# Patient Record
Sex: FEMALE | Race: WHITE
[De-identification: names, ages, dates, MRNs, and addresses within clinical notes are randomized per-mention and may not be internally consistent; named-entity substitution may affect disease eponyms.]

---

## 2022-12-05 ENCOUNTER — P2P PATIENT RECORD (OUTPATIENT)
Age: 67
End: 2022-12-05

## 2022-12-08 ENCOUNTER — P2P PATIENT RECORD (OUTPATIENT)
Age: 67
End: 2022-12-08

## 2023-01-12 ENCOUNTER — WEB ENCOUNTER (OUTPATIENT)
Dept: URBAN - METROPOLITAN AREA TELEHEALTH 2 | Facility: TELEHEALTH | Age: 68
End: 2023-01-12

## 2023-01-18 ENCOUNTER — LAB OUTSIDE AN ENCOUNTER (OUTPATIENT)
Dept: URBAN - METROPOLITAN AREA TELEHEALTH 2 | Facility: TELEHEALTH | Age: 68
End: 2023-01-18

## 2023-01-18 ENCOUNTER — TELEPHONE ENCOUNTER (OUTPATIENT)
Dept: URBAN - NONMETROPOLITAN AREA CLINIC 2 | Facility: CLINIC | Age: 68
End: 2023-01-18

## 2023-01-18 ENCOUNTER — OFFICE VISIT (OUTPATIENT)
Dept: URBAN - METROPOLITAN AREA TELEHEALTH 2 | Facility: TELEHEALTH | Age: 68
End: 2023-01-18
Payer: MEDICARE

## 2023-01-18 DIAGNOSIS — R11.2 NAUSEA AND VOMITING, UNSPECIFIED VOMITING TYPE: ICD-10-CM

## 2023-01-18 DIAGNOSIS — K57.30 ACQUIRED DIVERTICULOSIS OF COLON: ICD-10-CM

## 2023-01-18 DIAGNOSIS — K59.09 OTHER CONSTIPATION: ICD-10-CM

## 2023-01-18 DIAGNOSIS — Z12.11 COLON CANCER SCREENING: ICD-10-CM

## 2023-01-18 PROCEDURE — 99204 OFFICE O/P NEW MOD 45 MIN: CPT | Performed by: INTERNAL MEDICINE

## 2023-01-18 RX ORDER — OCTISALATE, AVOBENZONE, HOMOSALATE, AND OCTOCRYLENE 29.4; 29.4; 49; 25.48 MG/ML; MG/ML; MG/ML; MG/ML
AS DIRECTED LOTION TOPICAL
Status: ACTIVE | COMMUNITY

## 2023-01-18 RX ORDER — ESCITALOPRAM OXALATE 10 MG/1
1 TABLET TABLET, FILM COATED ORAL ONCE A DAY
Status: ACTIVE | COMMUNITY

## 2023-01-18 NOTE — HPI-TODAY'S VISIT:
1/18/2023 Rowan is a 66 YO who presents for evaluation of nausea, vomiting and diverticulitis. In September she developed UTI symptoms. She was treated with antibiotics with no relief. She went to [facility] and was diagnosed with transverse diverticulitis. She was treated with levoquin and flagyl. She subsequently went to the ED again in November 2022 with repeat imaging with resolution of her diverticulitis. She continued to have persistent N/V on a daily basis. Finally in December she was able to start keeping down liquids on a regular basis. She started vomiting again new year day with severe gas trapping. Her last colonoscopy was in 2020 by Dr. Jarvis. She has a history of pandiverticular disease and a sigmoid resection in 2008. She has had an EGD in the past but this was years ago. She is s/p CCY. She feels the glipizide seemed to trigger the UTI and cascaded this three months of severe symptoms of N/V and diverticulitis. She states the N/V have improved since 1/10/2023. She is now gas trapping with constipation again. She is not taking anything for her GI tract.   This telehealth visit was provided at the patient's home.

## 2023-02-14 ENCOUNTER — TELEPHONE ENCOUNTER (OUTPATIENT)
Dept: URBAN - METROPOLITAN AREA CLINIC 92 | Facility: CLINIC | Age: 68
End: 2023-02-14

## 2023-02-21 ENCOUNTER — WEB ENCOUNTER (OUTPATIENT)
Dept: URBAN - NONMETROPOLITAN AREA SURGERY CENTER 1 | Facility: SURGERY CENTER | Age: 68
End: 2023-02-21

## 2023-02-27 ENCOUNTER — CLAIMS CREATED FROM THE CLAIM WINDOW (OUTPATIENT)
Dept: URBAN - METROPOLITAN AREA CLINIC 4 | Facility: CLINIC | Age: 68
End: 2023-02-27
Payer: MEDICARE

## 2023-02-27 ENCOUNTER — OFFICE VISIT (OUTPATIENT)
Dept: URBAN - NONMETROPOLITAN AREA SURGERY CENTER 1 | Facility: SURGERY CENTER | Age: 68
End: 2023-02-27
Payer: MEDICARE

## 2023-02-27 ENCOUNTER — TELEPHONE ENCOUNTER (OUTPATIENT)
Dept: URBAN - METROPOLITAN AREA CLINIC 92 | Facility: CLINIC | Age: 68
End: 2023-02-27

## 2023-02-27 DIAGNOSIS — K21.9 ACID REFLUX: ICD-10-CM

## 2023-02-27 DIAGNOSIS — K31.89 OTHER DISEASES OF STOMACH AND DUODENUM: ICD-10-CM

## 2023-02-27 PROCEDURE — 88305 TISSUE EXAM BY PATHOLOGIST: CPT | Performed by: PATHOLOGY

## 2023-02-27 PROCEDURE — G8907 PT DOC NO EVENTS ON DISCHARG: HCPCS | Performed by: INTERNAL MEDICINE

## 2023-02-27 PROCEDURE — 88312 SPECIAL STAINS GROUP 1: CPT | Performed by: PATHOLOGY

## 2023-02-27 PROCEDURE — 43239 EGD BIOPSY SINGLE/MULTIPLE: CPT | Performed by: INTERNAL MEDICINE

## 2023-02-27 RX ORDER — ESCITALOPRAM OXALATE 10 MG/1
1 TABLET TABLET, FILM COATED ORAL ONCE A DAY
Status: ACTIVE | COMMUNITY

## 2023-02-27 RX ORDER — OMEPRAZOLE 40 MG/1
1 CAPSULE 30 MINUTES BEFORE MORNING MEAL CAPSULE, DELAYED RELEASE ORAL ONCE A DAY
Qty: 90 CAPSULES | Refills: 3 | OUTPATIENT
Start: 2023-02-27

## 2023-02-27 RX ORDER — OCTISALATE, AVOBENZONE, HOMOSALATE, AND OCTOCRYLENE 29.4; 29.4; 49; 25.48 MG/ML; MG/ML; MG/ML; MG/ML
AS DIRECTED LOTION TOPICAL
Status: ACTIVE | COMMUNITY

## 2023-03-02 ENCOUNTER — OFFICE VISIT (OUTPATIENT)
Dept: URBAN - METROPOLITAN AREA TELEHEALTH 2 | Facility: TELEHEALTH | Age: 68
End: 2023-03-02
Payer: MEDICARE

## 2023-03-02 VITALS — WEIGHT: 163 LBS | BODY MASS INDEX: 30.78 KG/M2 | HEIGHT: 61 IN

## 2023-03-02 DIAGNOSIS — E11.9 DIABETES: ICD-10-CM

## 2023-03-02 DIAGNOSIS — K31.84 GASTROPARESIS: ICD-10-CM

## 2023-03-02 PROCEDURE — 97802 MEDICAL NUTRITION INDIV IN: CPT | Performed by: DIETITIAN, REGISTERED

## 2023-03-02 RX ORDER — ESCITALOPRAM OXALATE 10 MG/1
1 TABLET TABLET, FILM COATED ORAL ONCE A DAY
Status: ACTIVE | COMMUNITY

## 2023-03-02 RX ORDER — OMEPRAZOLE 40 MG/1
1 CAPSULE 30 MINUTES BEFORE MORNING MEAL CAPSULE, DELAYED RELEASE ORAL ONCE A DAY
Qty: 90 CAPSULES | Refills: 3 | Status: ACTIVE | COMMUNITY
Start: 2023-02-27

## 2023-03-02 RX ORDER — OCTISALATE, AVOBENZONE, HOMOSALATE, AND OCTOCRYLENE 29.4; 29.4; 49; 25.48 MG/ML; MG/ML; MG/ML; MG/ML
AS DIRECTED LOTION TOPICAL
Status: ACTIVE | COMMUNITY

## 2023-06-07 ENCOUNTER — OFFICE VISIT (OUTPATIENT)
Dept: URBAN - METROPOLITAN AREA TELEHEALTH 2 | Facility: TELEHEALTH | Age: 68
End: 2023-06-07
Payer: MEDICARE

## 2023-06-07 DIAGNOSIS — K31.84 GASTROPARESIS: ICD-10-CM

## 2023-06-07 DIAGNOSIS — K21.00 GASTROESOPHAGEAL REFLUX DISEASE WITH ESOPHAGITIS WITHOUT HEMORRHAGE: ICD-10-CM

## 2023-06-07 DIAGNOSIS — K59.04 CHRONIC IDIOPATHIC CONSTIPATION: ICD-10-CM

## 2023-06-07 DIAGNOSIS — K57.90 DIVERTICULOSIS: ICD-10-CM

## 2023-06-07 PROBLEM — 266433003: Status: ACTIVE | Noted: 2023-02-27

## 2023-06-07 PROCEDURE — 99214 OFFICE O/P EST MOD 30 MIN: CPT | Performed by: NURSE PRACTITIONER

## 2023-06-07 RX ORDER — OCTISALATE, AVOBENZONE, HOMOSALATE, AND OCTOCRYLENE 29.4; 29.4; 49; 25.48 MG/ML; MG/ML; MG/ML; MG/ML
AS DIRECTED LOTION TOPICAL
Status: ACTIVE | COMMUNITY

## 2023-06-07 RX ORDER — ESCITALOPRAM OXALATE 10 MG/1
1 TABLET TABLET, FILM COATED ORAL ONCE A DAY
Status: ACTIVE | COMMUNITY

## 2023-06-07 RX ORDER — FAMOTIDINE 40 MG/1
1 TABLET AT BEDTIME TABLET, FILM COATED ORAL
Qty: 90 TABLET | Refills: 3 | OUTPATIENT
Start: 2023-06-07

## 2023-06-07 RX ORDER — OMEPRAZOLE 40 MG/1
1 CAPSULE 30 MINUTES BEFORE MORNING MEAL CAPSULE, DELAYED RELEASE ORAL ONCE A DAY
Qty: 90 CAPSULES | Refills: 3 | Status: ACTIVE | COMMUNITY
Start: 2023-02-27

## 2023-06-07 NOTE — HPI-TODAY'S VISIT:
1/18/2023 Rowan is a 68 YO who presents for evaluation of nausea, vomiting and diverticulitis. In September she developed UTI symptoms. She was treated with antibiotics with no relief. She went to [facility] and was diagnosed with transverse diverticulitis. She was treated with levoquin and flagyl. She subsequently went to the ED again in November 2022 with repeat imaging with resolution of her diverticulitis. She continued to have persistent N/V on a daily basis. Finally in December she was able to start keeping down liquids on a regular basis. She started vomiting again new year day with severe gas trapping. Her last colonoscopy was in 2020 by Dr. Jarvis. She has a history of pandiverticular disease and a sigmoid resection in 2008. She has had an EGD in the past but this was years ago. She is s/p CCY. She feels the glipizide seemed to trigger the UTI and cascaded this three months of severe symptoms of N/V and diverticulitis. She states the N/V have improved since 1/10/2023. She is now gas trapping with constipation again. She is not taking anything for her GI tract.   This telehealth visit was provided at the patient's home.  6/7/2023 Rowan presents for follow-up of gastroparesis.  Since her last visit her gastric emptying study is positive and her EGD reveals reflux and gastritis.  Her reflux has improved, she could not tolerate pantoprazole but is on omeprazole 40 mg in the morning.  Her nausea and vomiting have improved somewhat but she still has occasional episodes when she overeats.  She agrees her diet is poor.  She did meet with our nutritionist.  Her bowels have been more loose on fiber and MiraLAX, we have discussed cutting back on the fiber due to the gas at nighttime and cutting her MiraLAX to half capful daily.  She had no further episodes of diverticulitis.  Today she continues to struggle with reflux and bloating.  She continues to have abdominal obesity.  Today we have discussed pursuing strict nutritional regimen associated with gastroparesis.  She declines a trial of low-dose Reglan.  She has not lost any significant weight since her last visit.  She is currently off all of her diabetic medications.  Today she continues to struggle with her GI complaints.  MB  This telehealth visit was provided at the patient's home.

## 2023-11-08 ENCOUNTER — OFFICE VISIT (OUTPATIENT)
Dept: URBAN - NONMETROPOLITAN AREA CLINIC 13 | Facility: CLINIC | Age: 68
End: 2023-11-08
Payer: MEDICARE

## 2023-11-08 VITALS
DIASTOLIC BLOOD PRESSURE: 85 MMHG | BODY MASS INDEX: 30.66 KG/M2 | TEMPERATURE: 98.5 F | SYSTOLIC BLOOD PRESSURE: 130 MMHG | HEIGHT: 61 IN | HEART RATE: 108 BPM | WEIGHT: 162.4 LBS

## 2023-11-08 DIAGNOSIS — Z12.11 COLON CANCER SCREENING: ICD-10-CM

## 2023-11-08 DIAGNOSIS — K31.84 GASTROPARESIS: ICD-10-CM

## 2023-11-08 DIAGNOSIS — K57.90 DIVERTICULOSIS: ICD-10-CM

## 2023-11-08 DIAGNOSIS — K59.04 CHRONIC IDIOPATHIC CONSTIPATION: ICD-10-CM

## 2023-11-08 DIAGNOSIS — K21.00 GASTROESOPHAGEAL REFLUX DISEASE WITH ESOPHAGITIS WITHOUT HEMORRHAGE: ICD-10-CM

## 2023-11-08 PROBLEM — 397881000: Status: ACTIVE | Noted: 2023-01-18

## 2023-11-08 PROBLEM — 235675006: Status: ACTIVE | Noted: 2023-02-14

## 2023-11-08 PROBLEM — 82934008: Status: ACTIVE | Noted: 2023-06-07

## 2023-11-08 PROBLEM — 305058001: Status: ACTIVE | Noted: 2023-01-18

## 2023-11-08 PROCEDURE — 99214 OFFICE O/P EST MOD 30 MIN: CPT | Performed by: NURSE PRACTITIONER

## 2023-11-08 RX ORDER — METOCLOPRAMIDE HYDROCHLORIDE 15 MG/.07ML
1 SPRAY BEFORE MEALS AS NEEDED SPRAY NASAL
Qty: 1 UNSPECIFIED | Refills: 3 | OUTPATIENT
Start: 2023-11-08

## 2023-11-08 RX ORDER — FAMOTIDINE 40 MG/1
1 TABLET AT BEDTIME TABLET, FILM COATED ORAL
Qty: 90 TABLET | Refills: 3 | Status: ACTIVE | COMMUNITY
Start: 2023-06-07

## 2023-11-08 RX ORDER — OCTISALATE, AVOBENZONE, HOMOSALATE, AND OCTOCRYLENE 29.4; 29.4; 49; 25.48 MG/ML; MG/ML; MG/ML; MG/ML
AS DIRECTED LOTION TOPICAL
Status: ACTIVE | COMMUNITY

## 2023-11-08 RX ORDER — ESCITALOPRAM OXALATE 10 MG/1
1 TABLET TABLET, FILM COATED ORAL ONCE A DAY
Status: ON HOLD | COMMUNITY

## 2023-11-08 RX ORDER — OMEPRAZOLE 40 MG/1
1 CAPSULE 30 MINUTES BEFORE MORNING MEAL CAPSULE, DELAYED RELEASE ORAL ONCE A DAY
Qty: 90 CAPSULES | Refills: 3 | Status: ACTIVE | COMMUNITY
Start: 2023-02-27

## 2023-11-08 NOTE — HPI-TODAY'S VISIT:
1/18/2023 Rowan is a 68 YO who presents for evaluation of nausea, vomiting and diverticulitis. In September she developed UTI symptoms. She was treated with antibiotics with no relief. She went to [facility] and was diagnosed with transverse diverticulitis. She was treated with levoquin and flagyl. She subsequently went to the ED again in November 2022 with repeat imaging with resolution of her diverticulitis. She continued to have persistent N/V on a daily basis. Finally in December she was able to start keeping down liquids on a regular basis. She started vomiting again new year day with severe gas trapping. Her last colonoscopy was in 2020 by Dr. Jarvis. She has a history of pandiverticular disease and a sigmoid resection in 2008. She has had an EGD in the past but this was years ago. She is s/p CCY. She feels the glipizide seemed to trigger the UTI and cascaded this three months of severe symptoms of N/V and diverticulitis. She states the N/V have improved since 1/10/2023. She is now gas trapping with constipation again. She is not taking anything for her GI tract.   This telehealth visit was provided at the patient's home.  6/7/2023 Rowan presents for follow-up of gastroparesis.  Since her last visit her gastric emptying study is positive and her EGD reveals reflux and gastritis.  Her reflux has improved, she could not tolerate pantoprazole but is on omeprazole 40 mg in the morning.  Her nausea and vomiting have improved somewhat but she still has occasional episodes when she overeats.  She agrees her diet is poor.  She did meet with our nutritionist.  Her bowels have been more loose on fiber and MiraLAX, we have discussed cutting back on the fiber due to the gas at nighttime and cutting her MiraLAX to half capful daily.  She had no further episodes of diverticulitis.  Today she continues to struggle with reflux and bloating.  She continues to have abdominal obesity.  Today we have discussed pursuing strict nutritional regimen associated with gastroparesis.  She declines a trial of low-dose Reglan.  She has not lost any significant weight since her last visit.  She is currently off all of her diabetic medications.  Today she continues to struggle with her GI complaints.  MB  This telehealth visit was provided at the patient's home. 11/8/2023 Rowan presents for follow-up of gastroparesis.  She is on PPI in the morning and famotidine before supper.  She still has occasional episodes of nausea and vomiting.  Today we discussed risk and benefits and she does agree to pursue Reglan, she understands that there is a risk of tardive dyskinesia, we will try gimoti.  Her bowels are moving regularly on MiraLAX half capful daily and probiotic daily.  MB

## 2024-05-08 ENCOUNTER — OFFICE VISIT (OUTPATIENT)
Dept: URBAN - NONMETROPOLITAN AREA CLINIC 13 | Facility: CLINIC | Age: 69
End: 2024-05-08
Payer: MEDICARE

## 2024-05-08 ENCOUNTER — DASHBOARD ENCOUNTERS (OUTPATIENT)
Age: 69
End: 2024-05-08

## 2024-05-08 VITALS
BODY MASS INDEX: 30.47 KG/M2 | SYSTOLIC BLOOD PRESSURE: 134 MMHG | HEIGHT: 61 IN | WEIGHT: 161.4 LBS | DIASTOLIC BLOOD PRESSURE: 84 MMHG | HEART RATE: 84 BPM

## 2024-05-08 DIAGNOSIS — K57.90 DIVERTICULOSIS: ICD-10-CM

## 2024-05-08 DIAGNOSIS — K31.84 GASTROPARESIS: ICD-10-CM

## 2024-05-08 DIAGNOSIS — Z12.11 COLON CANCER SCREENING: ICD-10-CM

## 2024-05-08 DIAGNOSIS — K21.00 GASTROESOPHAGEAL REFLUX DISEASE WITH ESOPHAGITIS WITHOUT HEMORRHAGE: ICD-10-CM

## 2024-05-08 DIAGNOSIS — K59.04 CHRONIC IDIOPATHIC CONSTIPATION: ICD-10-CM

## 2024-05-08 PROCEDURE — 99214 OFFICE O/P EST MOD 30 MIN: CPT | Performed by: NURSE PRACTITIONER

## 2024-05-08 RX ORDER — FAMOTIDINE 40 MG/1
1 TABLET AT BEDTIME TABLET, FILM COATED ORAL
Qty: 90 TABLET | Refills: 3 | Status: ACTIVE | COMMUNITY
Start: 2023-06-07

## 2024-05-08 RX ORDER — INSULIN DEGLUDEC INJECTION 100 U/ML
AS DIRECTED INJECTION, SOLUTION SUBCUTANEOUS
Status: ACTIVE | COMMUNITY

## 2024-05-08 RX ORDER — ESCITALOPRAM OXALATE 10 MG/1
1 TABLET TABLET, FILM COATED ORAL ONCE A DAY
Status: ON HOLD | COMMUNITY

## 2024-05-08 RX ORDER — OMEPRAZOLE 20 MG/1
1 CAPSULE 30 MINUTES BEFORE MORNING MEAL CAPSULE, DELAYED RELEASE ORAL ONCE A DAY
Qty: 90 CAPSULES | Refills: 3
Start: 2023-02-27

## 2024-05-08 RX ORDER — OCTISALATE, AVOBENZONE, HOMOSALATE, AND OCTOCRYLENE 29.4; 29.4; 49; 25.48 MG/ML; MG/ML; MG/ML; MG/ML
AS DIRECTED LOTION TOPICAL
Status: ACTIVE | COMMUNITY

## 2024-05-08 RX ORDER — OMEPRAZOLE 40 MG/1
1 CAPSULE 30 MINUTES BEFORE MORNING MEAL CAPSULE, DELAYED RELEASE ORAL ONCE A DAY
Qty: 90 CAPSULES | Refills: 3 | Status: ACTIVE | COMMUNITY
Start: 2023-02-27

## 2024-06-05 ENCOUNTER — ERX REFILL RESPONSE (OUTPATIENT)
Dept: URBAN - NONMETROPOLITAN AREA CLINIC 2 | Facility: CLINIC | Age: 69
End: 2024-06-05

## 2024-06-05 RX ORDER — FAMOTIDINE 40 MG/1
1 TABLET AT BEDTIME TABLET, FILM COATED ORAL
Qty: 90 TABLET | Refills: 3 | OUTPATIENT

## 2024-11-01 ENCOUNTER — WEB ENCOUNTER (OUTPATIENT)
Dept: URBAN - NONMETROPOLITAN AREA CLINIC 2 | Facility: CLINIC | Age: 69
End: 2024-11-01

## 2024-11-07 ENCOUNTER — OFFICE VISIT (OUTPATIENT)
Dept: URBAN - NONMETROPOLITAN AREA CLINIC 2 | Facility: CLINIC | Age: 69
End: 2024-11-07
Payer: MEDICARE

## 2024-11-07 ENCOUNTER — LAB OUTSIDE AN ENCOUNTER (OUTPATIENT)
Dept: URBAN - NONMETROPOLITAN AREA CLINIC 2 | Facility: CLINIC | Age: 69
End: 2024-11-07

## 2024-11-07 DIAGNOSIS — K21.00 GASTROESOPHAGEAL REFLUX DISEASE WITH ESOPHAGITIS WITHOUT HEMORRHAGE: ICD-10-CM

## 2024-11-07 DIAGNOSIS — K31.84 GASTROPARESIS: ICD-10-CM

## 2024-11-07 DIAGNOSIS — K57.90 DIVERTICULOSIS: ICD-10-CM

## 2024-11-07 DIAGNOSIS — R10.32 LLQ ABDOMINAL PAIN: ICD-10-CM

## 2024-11-07 DIAGNOSIS — K59.04 CHRONIC IDIOPATHIC CONSTIPATION: ICD-10-CM

## 2024-11-07 DIAGNOSIS — Z12.11 COLON CANCER SCREENING: ICD-10-CM

## 2024-11-07 PROBLEM — 301716002: Status: ACTIVE | Noted: 2024-11-07

## 2024-11-07 PROCEDURE — 99214 OFFICE O/P EST MOD 30 MIN: CPT | Performed by: NURSE PRACTITIONER

## 2024-11-07 RX ORDER — OMEPRAZOLE 20 MG/1
1 CAPSULE 30 MINUTES BEFORE MORNING MEAL CAPSULE, DELAYED RELEASE ORAL ONCE A DAY
Qty: 90 CAPSULES | Refills: 3 | Status: ACTIVE | COMMUNITY
Start: 2023-02-27

## 2024-11-07 RX ORDER — RIFAXIMIN 550 MG/1
1 TABLET TABLET ORAL THREE TIMES A DAY
Qty: 42 TABLET | Refills: 0 | OUTPATIENT
Start: 2024-11-07 | End: 2024-11-21

## 2024-11-07 RX ORDER — ESCITALOPRAM OXALATE 10 MG/1
1 TABLET TABLET, FILM COATED ORAL ONCE A DAY
Status: ON HOLD | COMMUNITY

## 2024-11-07 RX ORDER — FAMOTIDINE 40 MG/1
1 TABLET AT BEDTIME TABLET, FILM COATED ORAL
Qty: 90 TABLET | Refills: 3 | Status: ACTIVE | COMMUNITY

## 2024-11-07 RX ORDER — INSULIN DEGLUDEC INJECTION 100 U/ML
AS DIRECTED INJECTION, SOLUTION SUBCUTANEOUS
Status: ACTIVE | COMMUNITY

## 2024-11-07 RX ORDER — OCTISALATE, AVOBENZONE, HOMOSALATE, AND OCTOCRYLENE 29.4; 29.4; 49; 25.48 MG/ML; MG/ML; MG/ML; MG/ML
AS DIRECTED LOTION TOPICAL
Status: ACTIVE | COMMUNITY

## 2024-11-07 NOTE — HPI-TODAY'S VISIT:
1/18/2023 Rowan is a 68 YO who presents for evaluation of nausea, vomiting and diverticulitis. In September she developed UTI symptoms. She was treated with antibiotics with no relief. She went to [facility] and was diagnosed with transverse diverticulitis. She was treated with levoquin and flagyl. She subsequently went to the ED again in November 2022 with repeat imaging with resolution of her diverticulitis. She continued to have persistent N/V on a daily basis. Finally in December she was able to start keeping down liquids on a regular basis. She started vomiting again new year day with severe gas trapping. Her last colonoscopy was in 2020 by Dr. Jarvis. She has a history of pandiverticular disease and a sigmoid resection in 2008. She has had an EGD in the past but this was years ago. She is s/p CCY. She feels the glipizide seemed to trigger the UTI and cascaded this three months of severe symptoms of N/V and diverticulitis. She states the N/V have improved since 1/10/2023. She is now gas trapping with constipation again. She is not taking anything for her GI tract.   This telehealth visit was provided at the patient's home.  6/7/2023 Rowan presents for follow-up of gastroparesis.  Since her last visit her gastric emptying study is positive and her EGD reveals reflux and gastritis.  Her reflux has improved, she could not tolerate pantoprazole but is on omeprazole 40 mg in the morning.  Her nausea and vomiting have improved somewhat but she still has occasional episodes when she overeats.  She agrees her diet is poor.  She did meet with our nutritionist.  Her bowels have been more loose on fiber and MiraLAX, we have discussed cutting back on the fiber due to the gas at nighttime and cutting her MiraLAX to half capful daily.  She had no further episodes of diverticulitis.  Today she continues to struggle with reflux and bloating.  She continues to have abdominal obesity.  Today we have discussed pursuing strict nutritional regimen associated with gastroparesis.  She declines a trial of low-dose Reglan.  She has not lost any significant weight since her last visit.  She is currently off all of her diabetic medications.  Today she continues to struggle with her GI complaints.  MB  This telehealth visit was provided at the patient's home. 11/8/2023 Rowan presents for follow-up of gastroparesis.  She is on PPI in the morning and famotidine before supper.  She still has occasional episodes of nausea and vomiting.  Today we discussed risk and benefits and she does agree to pursue Reglan, she understands that there is a risk of tardive dyskinesia, we will try gimoti.  Her bowels are moving regularly on MiraLAX half capful daily and probiotic daily.  MB 5/8/2024 Rowan presents for follow-up of reflux constipation and gastroparesis.  She is been transition to insulin and most of her GI complaints have resolved.  She is on omeprazole in the morning and famotidine in the evening.  She agrees to try to wean to 20 mg daily.  She is on a third of a capful of MiraLAX daily.  Today she is doing great from a GI standpoint.  MB 11/7/2024 Rowan presents for follow-up.  Since her last visit she has been having worsening episodes of acute abdominal pain worse in her left lower quadrant.  She does have a history of diverticulitis with pandiverticular disease.  She is status post sigmoid resection.  She is on a third of a capful of MiraLAX daily, she cannot tolerate psyllium.  Her reflux has been stable on omeprazole 20 in the morning and famotidine before supper.  Today her main complaint is intermittent abdominal pain despite normal bowel movements.  Will schedule repeat CT imaging to rule out diverticulitis, if positive consider antibiotics.  If negative consider a course of Xifaxan for irritable bowel syndrome and gas trapping.  MB

## 2024-11-07 NOTE — PHYSICAL EXAM NECK/THYROID:
normal appearance , no deformities , trachea midline PROCEDURES:  Repair, hernia, inguinal, open, using mesh, adult 20-Apr-2023 14:15:54  Eliot Franco

## 2024-11-08 ENCOUNTER — TELEPHONE ENCOUNTER (OUTPATIENT)
Dept: URBAN - NONMETROPOLITAN AREA CLINIC 13 | Facility: CLINIC | Age: 69
End: 2024-11-08

## 2024-11-11 ENCOUNTER — TELEPHONE ENCOUNTER (OUTPATIENT)
Dept: URBAN - NONMETROPOLITAN AREA CLINIC 13 | Facility: CLINIC | Age: 69
End: 2024-11-11

## 2024-11-14 ENCOUNTER — OFFICE VISIT (OUTPATIENT)
Dept: URBAN - NONMETROPOLITAN AREA CLINIC 2 | Facility: CLINIC | Age: 69
End: 2024-11-14

## 2024-12-06 ENCOUNTER — TELEPHONE ENCOUNTER (OUTPATIENT)
Dept: URBAN - NONMETROPOLITAN AREA CLINIC 2 | Facility: CLINIC | Age: 69
End: 2024-12-06

## 2025-05-08 ENCOUNTER — OFFICE VISIT (OUTPATIENT)
Dept: URBAN - NONMETROPOLITAN AREA CLINIC 2 | Facility: CLINIC | Age: 70
End: 2025-05-08
Payer: MEDICARE

## 2025-05-08 ENCOUNTER — LAB OUTSIDE AN ENCOUNTER (OUTPATIENT)
Dept: URBAN - NONMETROPOLITAN AREA CLINIC 2 | Facility: CLINIC | Age: 70
End: 2025-05-08

## 2025-05-08 DIAGNOSIS — R10.32 LLQ ABDOMINAL PAIN: ICD-10-CM

## 2025-05-08 DIAGNOSIS — K31.84 GASTROPARESIS: ICD-10-CM

## 2025-05-08 DIAGNOSIS — K57.90 DIVERTICULOSIS: ICD-10-CM

## 2025-05-08 DIAGNOSIS — Z12.11 COLON CANCER SCREENING: ICD-10-CM

## 2025-05-08 DIAGNOSIS — K59.04 CHRONIC IDIOPATHIC CONSTIPATION: ICD-10-CM

## 2025-05-08 DIAGNOSIS — K21.00 GASTROESOPHAGEAL REFLUX DISEASE WITH ESOPHAGITIS WITHOUT HEMORRHAGE: ICD-10-CM

## 2025-05-08 PROCEDURE — 99214 OFFICE O/P EST MOD 30 MIN: CPT | Performed by: NURSE PRACTITIONER

## 2025-05-08 RX ORDER — OMEPRAZOLE 20 MG/1
1 CAPSULE 30 MINUTES BEFORE MORNING MEAL CAPSULE, DELAYED RELEASE ORAL ONCE A DAY
Qty: 90 CAPSULES | Refills: 3 | Status: ACTIVE | COMMUNITY
Start: 2023-02-27

## 2025-05-08 RX ORDER — HYDROXYZINE HYDROCHLORIDE 25 MG/1
1 TABLET AS NEEDED TABLET, FILM COATED ORAL ONCE A DAY
Status: ACTIVE | COMMUNITY

## 2025-05-08 RX ORDER — ERGOCALCIFEROL 1.25 MG/1
1 CAPSULE CAPSULE ORAL
Status: ACTIVE | COMMUNITY

## 2025-05-08 RX ORDER — PRUCALOPRIDE 2 MG/1
1 TABLET TABLET, FILM COATED ORAL ONCE A DAY
Qty: 90 TABLET | Refills: 3 | OUTPATIENT
Start: 2025-05-08

## 2025-05-08 RX ORDER — ACETAMINOPHEN 160 MG/5ML
AS DIRECTED SUSPENSION, ORAL (FINAL DOSE FORM) ORAL
Status: ACTIVE | COMMUNITY

## 2025-05-08 RX ORDER — LOSARTAN POTASSIUM 25 MG/1
1 TABLET TABLET, FILM COATED ORAL ONCE A DAY
Status: ACTIVE | COMMUNITY

## 2025-05-08 RX ORDER — ESCITALOPRAM OXALATE 10 MG/1
1 TABLET TABLET, FILM COATED ORAL ONCE A DAY
Status: ON HOLD | COMMUNITY

## 2025-05-08 RX ORDER — FAMOTIDINE 40 MG/1
1 TABLET AT BEDTIME TABLET, FILM COATED ORAL
Qty: 90 TABLET | Refills: 3 | Status: ACTIVE | COMMUNITY

## 2025-05-08 RX ORDER — OCTISALATE, AVOBENZONE, HOMOSALATE, AND OCTOCRYLENE 29.4; 29.4; 49; 25.48 MG/ML; MG/ML; MG/ML; MG/ML
AS DIRECTED LOTION TOPICAL
Status: ACTIVE | COMMUNITY

## 2025-05-08 RX ORDER — ESTRADIOL 1 MG/1
1 TABLET TABLET ORAL ONCE A DAY
Status: ACTIVE | COMMUNITY

## 2025-05-08 RX ORDER — INSULIN DEGLUDEC INJECTION 100 U/ML
AS DIRECTED INJECTION, SOLUTION SUBCUTANEOUS
Status: ACTIVE | COMMUNITY

## 2025-05-08 NOTE — HPI-TODAY'S VISIT:
1/18/2023 Rowan is a 66 YO who presents for evaluation of nausea, vomiting and diverticulitis. In September she developed UTI symptoms. She was treated with antibiotics with no relief. She went to [facility] and was diagnosed with transverse diverticulitis. She was treated with levoquin and flagyl. She subsequently went to the ED again in November 2022 with repeat imaging with resolution of her diverticulitis. She continued to have persistent N/V on a daily basis. Finally in December she was able to start keeping down liquids on a regular basis. She started vomiting again new year day with severe gas trapping. Her last colonoscopy was in 2020 by Dr. Jarvis. She has a history of pandiverticular disease and a sigmoid resection in 2008. She has had an EGD in the past but this was years ago. She is s/p CCY. She feels the glipizide seemed to trigger the UTI and cascaded this three months of severe symptoms of N/V and diverticulitis. She states the N/V have improved since 1/10/2023. She is now gas trapping with constipation again. She is not taking anything for her GI tract.   This telehealth visit was provided at the patient's home.  6/7/2023 Rowan presents for follow-up of gastroparesis.  Since her last visit her gastric emptying study is positive and her EGD reveals reflux and gastritis.  Her reflux has improved, she could not tolerate pantoprazole but is on omeprazole 40 mg in the morning.  Her nausea and vomiting have improved somewhat but she still has occasional episodes when she overeats.  She agrees her diet is poor.  She did meet with our nutritionist.  Her bowels have been more loose on fiber and MiraLAX, we have discussed cutting back on the fiber due to the gas at nighttime and cutting her MiraLAX to half capful daily.  She had no further episodes of diverticulitis.  Today she continues to struggle with reflux and bloating.  She continues to have abdominal obesity.  Today we have discussed pursuing strict nutritional regimen associated with gastroparesis.  She declines a trial of low-dose Reglan.  She has not lost any significant weight since her last visit.  She is currently off all of her diabetic medications.  Today she continues to struggle with her GI complaints.  MB  This telehealth visit was provided at the patient's home. 11/8/2023 Rowan presents for follow-up of gastroparesis.  She is on PPI in the morning and famotidine before supper.  She still has occasional episodes of nausea and vomiting.  Today we discussed risk and benefits and she does agree to pursue Reglan, she understands that there is a risk of tardive dyskinesia, we will try gimoti.  Her bowels are moving regularly on MiraLAX half capful daily and probiotic daily.  MB 5/8/2024 Rowan presents for follow-up of reflux constipation and gastroparesis.  She is been transition to insulin and most of her GI complaints have resolved.  She is on omeprazole in the morning and famotidine in the evening.  She agrees to try to wean to 20 mg daily.  She is on a third of a capful of MiraLAX daily.  Today she is doing great from a GI standpoint.  MB 11/7/2024 Rowan presents for follow-up.  Since her last visit she has been having worsening episodes of acute abdominal pain worse in her left lower quadrant.  She does have a history of diverticulitis with pandiverticular disease.  She is status post sigmoid resection.  She is on a third of a capful of MiraLAX daily, she cannot tolerate psyllium.  Her reflux has been stable on omeprazole 20 in the morning and famotidine before supper.  Today her main complaint is intermittent abdominal pain despite normal bowel movements.  Will schedule repeat CT imaging to rule out diverticulitis, if positive consider antibiotics.  If negative consider a course of Xifaxan for irritable bowel syndrome and gas trapping.  MB 5/8/2025 Rowan presents for follow-up.  Since her last visit she continues to struggle with her bowels.  She reports incomplete evacuation.  She is having recurrent urinary tract infections and they feel like constipation is also contributing.  She also has gastroparesis.  She would be an excellent candidate for Motegrity given her chronic idiopathic constipation and gastroparesis.  She also has intermittent left lower quadrant abdominal pain.  Her CT scan is normal other than significant diverticular disease.  She did not take the Xifaxan.  I suspect that this is due to constipation as well.  She is due for her screening colonoscopy.  Today we will start Motegrity 2 mg daily and appeal for assistance through to Donna at [facility] and schedule surveillance colonoscopy.  MB

## 2025-06-25 ENCOUNTER — TELEPHONE ENCOUNTER (OUTPATIENT)
Dept: URBAN - NONMETROPOLITAN AREA CLINIC 2 | Facility: CLINIC | Age: 70
End: 2025-06-25

## 2025-06-25 RX ORDER — OMEPRAZOLE 20 MG/1
1 CAPSULE 30 MINUTES BEFORE MORNING MEAL CAPSULE, DELAYED RELEASE ORAL ONCE A DAY
Qty: 90 CAPSULES | Refills: 3
Start: 2023-02-27

## 2025-08-14 ENCOUNTER — ERX REFILL RESPONSE (OUTPATIENT)
Dept: URBAN - NONMETROPOLITAN AREA CLINIC 2 | Facility: CLINIC | Age: 70
End: 2025-08-14

## 2025-08-14 RX ORDER — FAMOTIDINE 40 MG/1
1 TABLET AT BEDTIME TABLET, FILM COATED ORAL
Qty: 90 TABLET | Refills: 3

## 2025-08-20 ENCOUNTER — OFFICE VISIT (OUTPATIENT)
Dept: URBAN - NONMETROPOLITAN AREA SURGERY CENTER 1 | Facility: SURGERY CENTER | Age: 70
End: 2025-08-20
Payer: MEDICARE

## 2025-08-20 ENCOUNTER — CLAIMS CREATED FROM THE CLAIM WINDOW (OUTPATIENT)
Dept: URBAN - NONMETROPOLITAN AREA SURGERY CENTER 1 | Facility: SURGERY CENTER | Age: 70
End: 2025-08-20

## 2025-08-20 DIAGNOSIS — K63.89 OTHER SPECIFIED DISEASES OF INTESTINE: ICD-10-CM

## 2025-08-20 DIAGNOSIS — R10.84 ABDOMINAL CRAMPING, GENERALIZED: ICD-10-CM

## 2025-08-20 DIAGNOSIS — K59.09 OTHER CONSTIPATION: ICD-10-CM

## 2025-08-20 PROCEDURE — 45385 COLONOSCOPY W/LESION REMOVAL: CPT | Performed by: INTERNAL MEDICINE

## 2025-08-20 RX ORDER — ACETAMINOPHEN 160 MG/5ML
AS DIRECTED SUSPENSION, ORAL (FINAL DOSE FORM) ORAL
Status: ACTIVE | COMMUNITY

## 2025-08-20 RX ORDER — PRUCALOPRIDE 2 MG/1
1 TABLET TABLET, FILM COATED ORAL ONCE A DAY
Qty: 90 TABLET | Refills: 3 | Status: ACTIVE | COMMUNITY
Start: 2025-05-08

## 2025-08-20 RX ORDER — OMEPRAZOLE 20 MG/1
1 CAPSULE 30 MINUTES BEFORE MORNING MEAL CAPSULE, DELAYED RELEASE ORAL ONCE A DAY
Qty: 90 CAPSULES | Refills: 3 | Status: ACTIVE | COMMUNITY
Start: 2023-02-27

## 2025-08-20 RX ORDER — LOSARTAN POTASSIUM 25 MG/1
1 TABLET TABLET, FILM COATED ORAL ONCE A DAY
Status: ACTIVE | COMMUNITY

## 2025-08-20 RX ORDER — ESCITALOPRAM OXALATE 10 MG/1
1 TABLET TABLET, FILM COATED ORAL ONCE A DAY
Status: ON HOLD | COMMUNITY

## 2025-08-20 RX ORDER — OCTISALATE, AVOBENZONE, HOMOSALATE, AND OCTOCRYLENE 29.4; 29.4; 49; 25.48 MG/ML; MG/ML; MG/ML; MG/ML
AS DIRECTED LOTION TOPICAL
Status: ACTIVE | COMMUNITY

## 2025-08-20 RX ORDER — ERGOCALCIFEROL 1.25 MG/1
1 CAPSULE CAPSULE ORAL
Status: ACTIVE | COMMUNITY

## 2025-08-20 RX ORDER — HYDROXYZINE HYDROCHLORIDE 25 MG/1
1 TABLET AS NEEDED TABLET, FILM COATED ORAL ONCE A DAY
Status: ACTIVE | COMMUNITY

## 2025-08-20 RX ORDER — INSULIN DEGLUDEC INJECTION 100 U/ML
AS DIRECTED INJECTION, SOLUTION SUBCUTANEOUS
Status: ACTIVE | COMMUNITY

## 2025-08-20 RX ORDER — ESTRADIOL 1 MG/1
1 TABLET TABLET ORAL ONCE A DAY
Status: ACTIVE | COMMUNITY

## 2025-08-20 RX ORDER — FAMOTIDINE 40 MG/1
1 TABLET AT BEDTIME TABLET, FILM COATED ORAL
Qty: 90 TABLET | Refills: 3 | Status: ACTIVE | COMMUNITY